# Patient Record
Sex: FEMALE | Race: AMERICAN INDIAN OR ALASKA NATIVE
[De-identification: names, ages, dates, MRNs, and addresses within clinical notes are randomized per-mention and may not be internally consistent; named-entity substitution may affect disease eponyms.]

---

## 2019-03-31 ENCOUNTER — HOSPITAL ENCOUNTER (EMERGENCY)
Dept: HOSPITAL 31 - C.ER | Age: 64
Discharge: HOME | End: 2019-03-31
Payer: MEDICAID

## 2019-03-31 VITALS
SYSTOLIC BLOOD PRESSURE: 119 MMHG | DIASTOLIC BLOOD PRESSURE: 73 MMHG | HEART RATE: 72 BPM | TEMPERATURE: 97.9 F | OXYGEN SATURATION: 98 % | RESPIRATION RATE: 16 BRPM

## 2019-03-31 DIAGNOSIS — M54.5: ICD-10-CM

## 2019-03-31 DIAGNOSIS — L02.416: Primary | ICD-10-CM

## 2019-03-31 LAB
ALBUMIN SERPL-MCNC: 4.8 {NULL, G/DL} (ref 3.5–5)
ALBUMIN/GLOB SERPL: 1.4 {NULL, NULL} (ref 1–2.1)
ALT SERPL-CCNC: 15 {NULL, U/L} (ref 9–52)
AST SERPL-CCNC: 61 {NULL, U/L} (ref 14–36)
BASOPHILS # BLD AUTO: 0.1 {NULL, K/UL} (ref 0–0.2)
BASOPHILS NFR BLD: 0.7 {NULL, %} (ref 0–2)
BUN SERPL-MCNC: 17 {NULL, MG/DL} (ref 7–17)
CALCIUM SERPL-MCNC: 9.6 {NULL, MG/DL} (ref 8.6–10.4)
EOSINOPHIL # BLD AUTO: 0.3 {NULL, K/UL} (ref 0–0.7)
EOSINOPHIL NFR BLD: 3 {NULL, %} (ref 0–4)
ERYTHROCYTE [DISTWIDTH] IN BLOOD BY AUTOMATED COUNT: 13.7 {NULL, %} (ref 11.5–14.5)
GFR NON-AFRICAN AMERICAN: > 60 {NULL, NULL}
HGB BLD-MCNC: 13.4 {NULL, G/DL} (ref 11–16)
LYMPHOCYTES # BLD AUTO: 2.9 {NULL, K/UL} (ref 1–4.3)
LYMPHOCYTES NFR BLD AUTO: 29.3 {NULL, %} (ref 20–40)
MCH RBC QN AUTO: 31.1 {NULL, PG} (ref 27–31)
MCHC RBC AUTO-ENTMCNC: 32.7 {NULL, G/DL} (ref 33–37)
MCV RBC AUTO: 94.9 {NULL, FL} (ref 81–99)
MONOCYTES # BLD: 0.8 {NULL, K/UL} (ref 0–0.8)
MONOCYTES NFR BLD: 8.6 {NULL, %} (ref 0–10)
NEUTROPHILS # BLD: 5.8 {NULL, K/UL} (ref 1.8–7)
NEUTROPHILS NFR BLD AUTO: 58.4 {NULL, %} (ref 50–75)
NRBC BLD AUTO-RTO: 0.1 {NULL, %} (ref 0–2)
PLATELET # BLD: 188 {NULL, K/UL} (ref 130–400)
PMV BLD AUTO: 10.3 {NULL, FL} (ref 7.2–11.7)
RBC # BLD AUTO: 4.3 {NULL, MIL/UL} (ref 3.8–5.2)
WBC # BLD AUTO: 9.9 {NULL, K/UL} (ref 4.8–10.8)

## 2019-03-31 PROCEDURE — 85378 FIBRIN DEGRADE SEMIQUANT: CPT

## 2019-03-31 PROCEDURE — 96374 THER/PROPH/DIAG INJ IV PUSH: CPT

## 2019-03-31 PROCEDURE — 80053 COMPREHEN METABOLIC PANEL: CPT

## 2019-03-31 PROCEDURE — 93005 ELECTROCARDIOGRAM TRACING: CPT

## 2019-03-31 PROCEDURE — 84443 ASSAY THYROID STIM HORMONE: CPT

## 2019-03-31 PROCEDURE — 99283 EMERGENCY DEPT VISIT LOW MDM: CPT

## 2019-03-31 PROCEDURE — 85025 COMPLETE CBC W/AUTO DIFF WBC: CPT

## 2019-03-31 NOTE — C.PDOC
History Of Present Illness





63 year old female presents with lower back pain for the past 2 days. Patient 

has Hx of lower back pain, does not have any medications to take for it. Denies 

weakness, numbness, or incontinence. Patient is also complaining of a painful 

lump to the back of the left calf for the past few days. Denies trauma, recent 

prolonged travel, use of oral contraceptives, or Hx of coagulopathy. Patient is 

also a heroin abuser, denies IVDA, only uses intranasally, has never used 

through her left leg.





Time Seen by Provider: 19 01:23


Chief Complaint (Nursing): High Blood Pressure


History Per: Patient


History/Exam Limitations: no limitations


Onset/Duration Of Symptoms: Days


Current Symptoms Are (Timing): Still Present


Recent travel outside of the [place]: No





Past Medical History


Reviewed: Historical Data, Nursing Documentation, Vital Signs


Vital Signs: 





                                Last Vital Signs











Temp  97.9 F   19 03:51


 


Pulse  72   19 03:51


 


Resp  16   19 03:51


 


BP  119/73   19 03:51


 


Pulse Ox  98   19 03:51














- Medical History


PMH: 


   Denies: Chronic Kidney Disease


Family History: States: Unknown Family Hx





- Social History


Hx Alcohol Use: No


Hx Substance Use: Yes (HEROIN)





- Immunization History


Hx Tetanus Toxoid Vaccination: No


Hx Influenza Vaccination: No


Hx Pneumococcal Vaccination: No





Review Of Systems


Constitutional: Negative for: Fever, Chills


Cardiovascular: Negative for: Chest Pain, Palpitations


Respiratory: Negative for: Cough, Shortness of Breath


Genitourinary: Negative for: Dysuria, Incontinence, Hematuria


Musculoskeletal: Positive for: Back Pain, Other (Painful lump to back of left 

calf)


Neurological: Negative for: Weakness, Numbness





Physical Exam





- Physical Exam


Appears: Non-toxic, Other (elevated BP on arrival)


Skin: Warm


Head: Atraumatic, Normacephalic


Eye(s): bilateral: Normal Inspection


Oral Mucosa: Moist


Chest: Symmetrical, No Tenderness


Cardiovascular: Rhythm Regular, Other (Hypertensive)


Respiratory: Normal Breath Sounds, No Rales, No Rhonchi, No Wheezing


Gastrointestinal/Abdominal: Normal Exam, Soft, No Tenderness


Back: No CVA Tenderness, No Vertebral Tenderness, Paraspinal Tenderness (Mild 

lumbar), No Straight Leg Raising


Extremity: Normal ROM (x4), No Calf Tenderness, Other (2x2cm mobile tender juvenal

rated mass to left calf with some localized erythema and warmth , non fluctuant)


Pulses: Left Dorsalis Pedis: Normal, Right Dorsalis Pedis: Normal


Neurological/Psych: Oriented x3, Normal Speech, Normal Motor, Normal Sensation


Gait: Steady





ED Course And Treatment





- Laboratory Results


Result Diagrams: 


                                 19 02:11





                                 19 02:11


Lab Results: 





                                        











D-Dimer, Quantitative  607 ng/mlDDU (0-243)  H  19  02:11    








                                        











Total Bilirubin  0.7 mg/dL (0.2-1.3)   19  02:11    


 


AST  61 U/L (14-36)  H  19  02:11    


 


ALT  15 U/L (9-52)   19  02:11    


 


Alkaline Phosphatase  78 U/L ()   19  02:11    


 


Total Protein  8.2 g/dL (6.3-8.3)   19  02:11    


 


Albumin  4.8 g/dL (3.5-5.0)   19  02:11    


 


Globulin  3.4 gm/dL (2.2-3.9)   19  02:11    


 


Albumin/Globulin Ratio  1.4  (1.0-2.1)   19  02:11    











O2 Sat by Pulse Oximetry: 98 (Room air)


Pulse Ox Interpretation: Normal


Progress Note: Labs ordered, ddimer slightly elevated but no definite suspicion 

for DVT on physical exam, likely skin cyst vs earler abscess, advised patient to

started keflex, take motrin for pain, and return if moderate calf swelling, SOB,

or worse. Pt also was informed of BP reading on arrival which has normalized 

without the use of hypertensives. Pt advised follwo up in clinic, Return 

precautions were discussed and pt [id number] verbalized understanding


Reassessment Condition: Improved





Medical Decision Making


Medical Decision Makin y/o female heroin addict, non IVDA h/o of chronic low back pain and lump to 

left calm, elev BPa on arrival- Pt states new onset


Back pain is lower towards sacrum worse on ambulation- 


Aortic dissection, spinal cord comrpression, nusculoskeletal pain were all 

considerd. pain is most likely muscular and BP has self improved.





- 





Disposition


Counseled Patient/Family Regarding: Diagnosis, Need For Followup





- Disposition


Referrals: 


[facility] [Outside]


Disposition: HOME/ ROUTINE


Disposition Time: 05:29


Condition: STABLE


Additional Instructions: 


Take medications as directed





Please follwo up in clinic tomorrow





Return to ER if increasing swelling to leg, fever, difficulty breathing or worse

 


Prescriptions: 


Cephalexin [cephalexin] 500 mg PO Q6 #28 cap


Ibuprofen [Motrin] 600 mg PO Q6H #20 tab


Instructions:  Low Back Pain in Adults, Skin Abscess, Lung Abscess


Forms:  [employer] (English)





- Clinical Impression


Clinical Impression: 


 Abscess of leg, left, Low back pain








- PA / NP / Resident Statement


MD/DO has reviewed & agrees with the documentation as recorded.





- Scribe Statement


The provider has reviewed the documentation as recorded by the [employer]rib[employer] Long





All medical record entries made by the Gaganibsupa were at my direction and 

personally dictated by me. I have reviewed the chart and agree that the record a

ccurately reflects my personal performance of the history, physical exam, 

medical decision making, and the department course for this patient. I have also

 personally directed, reviewed, and agree with the discharge instructions and 

disposition.

## 2019-04-02 NOTE — CARD
--------------- APPROVED REPORT --------------





Date of service: 03/31/2019



EKG Measurement

Heart [id number]

SD [id number]

[id number]

[id number]

[id number]



<Conclusion>

Normal sinus rhythm

Possible Anterior infarct, age undetermined

Abnormal ECG